# Patient Record
Sex: MALE
[De-identification: names, ages, dates, MRNs, and addresses within clinical notes are randomized per-mention and may not be internally consistent; named-entity substitution may affect disease eponyms.]

---

## 2020-09-21 ENCOUNTER — APPOINTMENT (OUTPATIENT)
Dept: ORTHOPEDIC SURGERY | Facility: CLINIC | Age: 55
End: 2020-09-21
Payer: COMMERCIAL

## 2020-09-21 VITALS
DIASTOLIC BLOOD PRESSURE: 74 MMHG | HEART RATE: 79 BPM | BODY MASS INDEX: 30.48 KG/M2 | WEIGHT: 225 LBS | TEMPERATURE: 98.1 F | SYSTOLIC BLOOD PRESSURE: 120 MMHG | HEIGHT: 72 IN

## 2020-09-21 DIAGNOSIS — M77.8 OTHER ENTHESOPATHIES, NOT ELSEWHERE CLASSIFIED: ICD-10-CM

## 2020-09-21 DIAGNOSIS — Z78.9 OTHER SPECIFIED HEALTH STATUS: ICD-10-CM

## 2020-09-21 DIAGNOSIS — Z72.89 OTHER PROBLEMS RELATED TO LIFESTYLE: ICD-10-CM

## 2020-09-21 PROCEDURE — 73110 X-RAY EXAM OF WRIST: CPT | Mod: RT

## 2020-09-21 PROCEDURE — 99204 OFFICE O/P NEW MOD 45 MIN: CPT

## 2020-09-21 NOTE — HISTORY OF PRESENT ILLNESS
[FreeTextEntry1] : 09/21/2020: The patient is a 55-year-old right-hand-dominant male who presents to the office with bilateral wrist pain, right worse than left. There is pain is located in the ulnar aspect of both wrists. The right wrist pain radiates to the middle of the wrist, specifically on the volar aspect. It is associated with significant stiffness. It is worse with activity. He has not taken any medications to alleviate his symptoms. He denies any paresthesias in the hand.

## 2020-09-21 NOTE — PHYSICAL EXAM
[Normal RUE] : Right Upper Extremity: No scars, rashes, lesions, ulcers, skin intact [Normal LUE] : Left Upper Extremity: No scars, rashes, lesions, ulcers, skin intact [Normal Finger/nose] : finger to nose coordination [Normal] : no peripheral adenopathy appreciated [de-identified] : bilateral wrists:\par Skin intact no swelling no erythema no ecchymosis\par range of motion of the digits and wrists intact\par no pain at the snuffbox, SL interval, fovea\par Mild tenderness along the ECU tendon sheath bilaterally\par Negative Phalen's, negative Tinel at the wrist\par Neurovascular intact distally [de-identified] : álvaror [de-identified] : 3 x-ray views of the right hand are reviewed there is no osseous abnormality

## 2020-09-21 NOTE — ASSESSMENT
[FreeTextEntry1] : 55-year-old male with bilateral wrist pain consistent with mild tendinitis. I recommended a course of anti-inflammatory medication and use of a wrist immobilizer for activity.If his symptoms do not continue to resolve he will follow up for evaluation.

## 2020-09-23 RX ORDER — MELOXICAM 15 MG/1
15 TABLET ORAL
Qty: 30 | Refills: 2 | Status: ACTIVE | COMMUNITY
Start: 2020-09-23 | End: 1900-01-01